# Patient Record
Sex: MALE | Race: WHITE | NOT HISPANIC OR LATINO | Employment: OTHER | URBAN - NONMETROPOLITAN AREA
[De-identification: names, ages, dates, MRNs, and addresses within clinical notes are randomized per-mention and may not be internally consistent; named-entity substitution may affect disease eponyms.]

---

## 2019-03-12 ENCOUNTER — OFFICE VISIT (OUTPATIENT)
Dept: UROLOGY | Facility: CLINIC | Age: 74
End: 2019-03-12

## 2019-03-12 VITALS — HEIGHT: 71 IN | WEIGHT: 178.4 LBS | BODY MASS INDEX: 24.98 KG/M2

## 2019-03-12 DIAGNOSIS — R33.8 BENIGN PROSTATIC HYPERPLASIA WITH URINARY RETENTION: Primary | ICD-10-CM

## 2019-03-12 DIAGNOSIS — N40.1 BENIGN PROSTATIC HYPERPLASIA WITH URINARY RETENTION: Primary | ICD-10-CM

## 2019-03-12 PROCEDURE — 99203 OFFICE O/P NEW LOW 30 MIN: CPT | Performed by: UROLOGY

## 2019-03-12 RX ORDER — CHLORTHALIDONE 50 MG/1
25 TABLET ORAL DAILY
COMMUNITY

## 2019-03-12 RX ORDER — POTASSIUM CHLORIDE 1.5 G/1.77G
20 POWDER, FOR SOLUTION ORAL DAILY
COMMUNITY

## 2019-03-12 RX ORDER — FINASTERIDE 5 MG/1
5 TABLET, FILM COATED ORAL DAILY
Qty: 30 TABLET | Refills: 11 | Status: SHIPPED | OUTPATIENT
Start: 2019-03-12

## 2019-03-12 RX ORDER — LISINOPRIL 20 MG/1
1 TABLET ORAL DAILY
COMMUNITY
Start: 2019-01-08

## 2019-03-12 RX ORDER — OXYBUTYNIN CHLORIDE 5 MG/1
1 TABLET ORAL DAILY
COMMUNITY
Start: 2019-01-08 | End: 2019-03-12 | Stop reason: ALTCHOICE

## 2019-03-12 RX ORDER — TAMSULOSIN HYDROCHLORIDE 0.4 MG/1
1 CAPSULE ORAL NIGHTLY
COMMUNITY
Start: 2018-12-15

## 2019-03-12 RX ORDER — DILTIAZEM HYDROCHLORIDE 360 MG/1
1 CAPSULE, EXTENDED RELEASE ORAL DAILY
COMMUNITY
Start: 2019-01-09

## 2019-03-12 RX ORDER — PRAVASTATIN SODIUM 40 MG
1 TABLET ORAL NIGHTLY
COMMUNITY
Start: 2019-01-14

## 2019-03-12 RX ORDER — HYDROCODONE BITARTRATE AND ACETAMINOPHEN 10; 325 MG/1; MG/1
1 TABLET ORAL 2 TIMES DAILY
COMMUNITY
Start: 2019-02-20

## 2019-03-12 RX ORDER — FUROSEMIDE 20 MG/1
1 TABLET ORAL DAILY
COMMUNITY
Start: 2019-01-08

## 2019-03-12 NOTE — PROGRESS NOTES
Chief Complaint:          retention    HPI:   74 y.o. male.  Pt went into retention and had a catheter placed for over a liter.  Pt has been flomax for 3 years. Pt had colon cancer diagnosed in  2016.  He sees Dr. Dempsey for his colon cancer.  HPI      Past Medical History:        Past Medical History:   Diagnosis Date   • Cancer (CMS/HCC)    • Colon cancer (CMS/HCC)    • Diabetes (CMS/HCC)    • Hypertension          Current Meds:     Current Outpatient Medications   Medication Sig Dispense Refill   • chlorthalidone (HYGROTEN) 50 MG tablet Take 25 mg by mouth Daily.     • diltiaZEM (TIAZAC) 360 MG 24 hr capsule Take 1 capsule by mouth Daily.     • furosemide (LASIX) 20 MG tablet Take 1 tablet by mouth Daily.     • HYDROcodone-acetaminophen (NORCO)  MG per tablet Take 1 tablet by mouth 2 (Two) Times a Day.     • insulin NPH (humuLIN N,novoLIN N) 100 UNIT/ML injection Inject 30 Units under the skin into the appropriate area as directed 2 (Two) Times a Day Before Meals.     • insulin regular (humuLIN R,novoLIN R) 100 UNIT/ML injection Inject 10 Units under the skin into the appropriate area as directed 2 (Two) Times a Day Before Meals.     • lisinopril (PRINIVIL,ZESTRIL) 20 MG tablet Take 1 tablet by mouth Daily.     • potassium chloride (KLOR-CON) 20 MEQ packet Take 20 mEq by mouth Daily.     • pravastatin (PRAVACHOL) 40 MG tablet Take 1 tablet by mouth Every Night.     • tamsulosin (FLOMAX) 0.4 MG capsule 24 hr capsule Take 1 capsule by mouth Every Night.     • finasteride (PROSCAR) 5 MG tablet Take 1 tablet by mouth Daily. 30 tablet 11     No current facility-administered medications for this visit.         Allergies:      Allergies   Allergen Reactions   • Dexamethasone Other (See Comments)     Unsure   • Gabapentin Other (See Comments)     Unsure   • Hydrocortisone Unknown (See Comments)     Unsure   • Prednisolone Other (See Comments)     Unsure   • Pregabalin Other (See Comments)     Unsure        Past  Surgical History:     Past Surgical History:   Procedure Laterality Date   • COLON SURGERY     • LUNG BIOPSY           Social History:     Social History     Socioeconomic History   • Marital status:      Spouse name: Not on file   • Number of children: Not on file   • Years of education: Not on file   • Highest education level: Not on file   Social Needs   • Financial resource strain: Not on file   • Food insecurity - worry: Not on file   • Food insecurity - inability: Not on file   • Transportation needs - medical: Not on file   • Transportation needs - non-medical: Not on file   Occupational History   • Not on file   Tobacco Use   • Smoking status: Current Every Day Smoker   • Smokeless tobacco: Never Used   Substance and Sexual Activity   • Alcohol use: Yes     Frequency: Never   • Drug use: No   • Sexual activity: Not on file   Other Topics Concern   • Not on file   Social History Narrative   • Not on file       Family History:     Family History   Problem Relation Age of Onset   • Hypertension Father    • Heart disease Father    • Cancer Mother    • Diabetes insipidus Paternal Grandmother        Review of Systems:     Review of Systems   Constitutional: Positive for fatigue. Negative for chills and fever.   HENT: Negative for sinus pressure and sinus pain.    Respiratory: Negative for cough.    Cardiovascular: Positive for leg swelling.   Gastrointestinal: Positive for diarrhea. Negative for abdominal pain and constipation.   Genitourinary: Positive for dysuria, hematuria and penile pain. Negative for frequency and urgency.   Neurological: Negative for headaches.   Psychiatric/Behavioral: The patient is not nervous/anxious.          Physical Exam:     Physical Exam   Constitutional: He is oriented to person, place, and time.   HENT:   Head: Normocephalic and atraumatic.   Right Ear: External ear normal.   Left Ear: External ear normal.   Nose: Nose normal.   Mouth/Throat: Oropharynx is clear and moist.  "  Eyes: Conjunctivae and EOM are normal. Pupils are equal, round, and reactive to light.   Neck: Normal range of motion. Neck supple. No thyromegaly present.   Cardiovascular: Normal rate, regular rhythm, normal heart sounds and intact distal pulses.   No murmur heard.  Pulmonary/Chest: Effort normal and breath sounds normal. No respiratory distress. He has no wheezes. He has no rales. He exhibits no tenderness.   Abdominal: Soft. Bowel sounds are normal. He exhibits no distension and no mass. There is no tenderness. No hernia.   Genitourinary: Rectum normal, prostate normal and penis normal.   Genitourinary Comments: Moderately enlarged no nodules   Musculoskeletal: Normal range of motion. He exhibits no edema or tenderness.   Lymphadenopathy:     He has no cervical adenopathy.   Neurological: He is alert and oriented to person, place, and time. No cranial nerve deficit. He exhibits normal muscle tone. Coordination normal.   Skin: Skin is warm. No rash noted.   Psychiatric: He has a normal mood and affect. His behavior is normal. Judgment and thought content normal.   Nursing note and vitals reviewed.      Ht 180.3 cm (71\")   Wt 80.9 kg (178 lb 6.4 oz)   BMI 24.88 kg/m²    Procedure:         Assessment:     Encounter Diagnosis   Name Primary?   • Benign prostatic hyperplasia with urinary retention Yes     Orders Placed This Encounter   Procedures   • PSA DIAGNOSTIC     Standing Status:   Future     Standing Expiration Date:   3/11/2020       Plan:   Will add proscar to his flomax and will check a psa today.  Will do voiding trial next week.    Patient's Body mass index is 24.88 kg/m². BMI is within normal parameters. No follow-up required..      Counseling was given to patient and family for the following topics instructions for management as follows: retention. The interim medical history and current results were reviewed.  A treatment plan with follow-up was made for Benign prostatic hyperplasia with urinary " retention [N40.1, R33.8].   This document has been electronically signed by John Mcgovern MD March 12, 2019 3:30 PM

## 2019-03-19 ENCOUNTER — OFFICE VISIT (OUTPATIENT)
Dept: UROLOGY | Facility: CLINIC | Age: 74
End: 2019-03-19

## 2019-03-19 VITALS — BODY MASS INDEX: 24.92 KG/M2 | HEIGHT: 71 IN | WEIGHT: 178 LBS

## 2019-03-19 DIAGNOSIS — N40.1 BENIGN PROSTATIC HYPERPLASIA WITH URINARY RETENTION: Primary | ICD-10-CM

## 2019-03-19 DIAGNOSIS — N39.0 URINARY TRACT INFECTION WITHOUT HEMATURIA, SITE UNSPECIFIED: ICD-10-CM

## 2019-03-19 DIAGNOSIS — R33.8 BENIGN PROSTATIC HYPERPLASIA WITH URINARY RETENTION: Primary | ICD-10-CM

## 2019-03-19 PROCEDURE — 99213 OFFICE O/P EST LOW 20 MIN: CPT | Performed by: UROLOGY

## 2019-03-19 RX ORDER — NITROFURANTOIN MACROCRYSTALS 100 MG/1
100 CAPSULE ORAL 4 TIMES DAILY
Qty: 28 CAPSULE | Refills: 0 | Status: SHIPPED | OUTPATIENT
Start: 2019-03-19 | End: 2019-03-26

## 2019-03-19 NOTE — PROGRESS NOTES
Chief Complaint:          retention    HPI:   74 y.o. male.  Pt has voided 3 times since his catheter came out and his post void bladder scan is 48 cc today.  Pt has been much improved.  HPI      Past Medical History:        Past Medical History:   Diagnosis Date   • Cancer (CMS/HCC)    • Colon cancer (CMS/HCC)    • Diabetes (CMS/HCC)    • Hypertension          Current Meds:     Current Outpatient Medications   Medication Sig Dispense Refill   • chlorthalidone (HYGROTEN) 50 MG tablet Take 25 mg by mouth Daily.     • diltiaZEM (TIAZAC) 360 MG 24 hr capsule Take 1 capsule by mouth Daily.     • finasteride (PROSCAR) 5 MG tablet Take 1 tablet by mouth Daily. 30 tablet 11   • furosemide (LASIX) 20 MG tablet Take 1 tablet by mouth Daily.     • HYDROcodone-acetaminophen (NORCO)  MG per tablet Take 1 tablet by mouth 2 (Two) Times a Day.     • insulin NPH (humuLIN N,novoLIN N) 100 UNIT/ML injection Inject 30 Units under the skin into the appropriate area as directed 2 (Two) Times a Day Before Meals.     • insulin regular (humuLIN R,novoLIN R) 100 UNIT/ML injection Inject 10 Units under the skin into the appropriate area as directed 2 (Two) Times a Day Before Meals.     • lisinopril (PRINIVIL,ZESTRIL) 20 MG tablet Take 1 tablet by mouth Daily.     • potassium chloride (KLOR-CON) 20 MEQ packet Take 20 mEq by mouth Daily.     • pravastatin (PRAVACHOL) 40 MG tablet Take 1 tablet by mouth Every Night.     • tamsulosin (FLOMAX) 0.4 MG capsule 24 hr capsule Take 1 capsule by mouth Every Night.     • nitrofurantoin (MACRODANTIN) 100 MG capsule Take 1 capsule by mouth 4 (Four) Times a Day for 7 days. 28 capsule 0     No current facility-administered medications for this visit.         Allergies:      Allergies   Allergen Reactions   • Dexamethasone Other (See Comments)     Unsure   • Gabapentin Other (See Comments)     Unsure   • Hydrocortisone Unknown (See Comments)     Unsure   • Prednisolone Other (See Comments)     Unsure    • Pregabalin Other (See Comments)     Unsure        Past Surgical History:     Past Surgical History:   Procedure Laterality Date   • COLON SURGERY     • LUNG BIOPSY           Social History:     Social History     Socioeconomic History   • Marital status:      Spouse name: Not on file   • Number of children: Not on file   • Years of education: Not on file   • Highest education level: Not on file   Social Needs   • Financial resource strain: Not on file   • Food insecurity - worry: Not on file   • Food insecurity - inability: Not on file   • Transportation needs - medical: Not on file   • Transportation needs - non-medical: Not on file   Occupational History   • Not on file   Tobacco Use   • Smoking status: Current Every Day Smoker   • Smokeless tobacco: Never Used   Substance and Sexual Activity   • Alcohol use: Yes     Frequency: Never   • Drug use: No   • Sexual activity: Not on file   Other Topics Concern   • Not on file   Social History Narrative   • Not on file       Family History:     Family History   Problem Relation Age of Onset   • Hypertension Father    • Heart disease Father    • Cancer Mother    • Diabetes insipidus Paternal Grandmother        Review of Systems:     Review of Systems   Constitutional: Negative for fatigue.   HENT: Negative for sinus pressure and sinus pain.    Eyes: Negative for pain and redness.   Respiratory: Negative for chest tightness.    Cardiovascular: Negative for chest pain.   Gastrointestinal: Negative for abdominal pain, constipation and diarrhea.   Endocrine: Negative for heat intolerance.   Genitourinary: Negative for difficulty urinating and hematuria.   Musculoskeletal: Negative for back pain.   Skin: Negative for rash.   Allergic/Immunologic: Negative for food allergies.   Neurological: Negative for headaches.   Hematological: Does not bruise/bleed easily.   Psychiatric/Behavioral: The patient is not nervous/anxious.              I have reviewed the follow  "portions of the patient's history and confirmed they are accurate today:  allergies, current medications, past family history, past medical history, past social history, past surgical history, problem list and ROS  Physical Exam:     Physical Exam   Constitutional: He is oriented to person, place, and time.   HENT:   Head: Normocephalic and atraumatic.   Right Ear: External ear normal.   Left Ear: External ear normal.   Nose: Nose normal.   Mouth/Throat: Oropharynx is clear and moist.   Eyes: Conjunctivae and EOM are normal. Pupils are equal, round, and reactive to light.   Neck: Normal range of motion. Neck supple. No thyromegaly present.   Cardiovascular: Normal rate, regular rhythm, normal heart sounds and intact distal pulses.   No murmur heard.  Pulmonary/Chest: Effort normal and breath sounds normal. No respiratory distress. He has no wheezes. He has no rales. He exhibits no tenderness.   Abdominal: Soft. Bowel sounds are normal. He exhibits no distension and no mass. There is no tenderness. No hernia.   Genitourinary: Penis normal.   Musculoskeletal: Normal range of motion. He exhibits no edema or tenderness.   Lymphadenopathy:     He has no cervical adenopathy.   Neurological: He is alert and oriented to person, place, and time. No cranial nerve deficit. He exhibits normal muscle tone. Coordination normal.   Skin: Skin is warm. No rash noted.   Psychiatric: He has a normal mood and affect. His behavior is normal. Judgment and thought content normal.   Nursing note and vitals reviewed.      Ht 180.3 cm (71\")   Wt 80.7 kg (178 lb)   BMI 24.83 kg/m²    Procedure:         Assessment:     Encounter Diagnoses   Name Primary?   • Benign prostatic hyperplasia with urinary retention Yes   • Urinary tract infection without hematuria, site unspecified      No orders of the defined types were placed in this encounter.      Plan:   Will see him back in 6 months and he needs a psa today.  Will treat catheter related " bacteria with macrodantin     Patient's Body mass index is 24.83 kg/m². BMI is within normal parameters. No follow-up required..      Counseling was given to patient for the following topics instructions for management as follows: bph. The interim medical history and current results were reviewed.  A treatment plan with follow-up was made for Benign prostatic hyperplasia with urinary retention [N40.1, R33.8].  This document has been electronically signed by John Mcgovern MD March 19, 2019 2:37 PM

## 2019-04-23 ENCOUNTER — OFFICE VISIT (OUTPATIENT)
Dept: UROLOGY | Facility: CLINIC | Age: 74
End: 2019-04-23

## 2019-04-23 DIAGNOSIS — R33.8 BENIGN PROSTATIC HYPERPLASIA WITH URINARY RETENTION: Primary | ICD-10-CM

## 2019-04-23 DIAGNOSIS — N40.1 BENIGN PROSTATIC HYPERPLASIA WITH URINARY RETENTION: Primary | ICD-10-CM

## 2019-04-23 LAB
BILIRUB BLD-MCNC: NEGATIVE MG/DL
CLARITY, POC: CLEAR
COLOR UR: YELLOW
GLUCOSE UR STRIP-MCNC: ABNORMAL MG/DL
KETONES UR QL: NEGATIVE
LEUKOCYTE EST, POC: NEGATIVE
NITRITE UR-MCNC: NEGATIVE MG/ML
PH UR: 5 [PH] (ref 5–8)
PROT UR STRIP-MCNC: NEGATIVE MG/DL
RBC # UR STRIP: NEGATIVE /UL
SP GR UR: 1.01 (ref 1–1.03)
UROBILINOGEN UR QL: NORMAL

## 2019-04-23 PROCEDURE — 81002 URINALYSIS NONAUTO W/O SCOPE: CPT | Performed by: UROLOGY

## 2019-04-23 PROCEDURE — 51798 US URINE CAPACITY MEASURE: CPT | Performed by: UROLOGY

## 2019-04-23 PROCEDURE — 84153 ASSAY OF PSA TOTAL: CPT | Performed by: UROLOGY

## 2019-04-23 PROCEDURE — 99214 OFFICE O/P EST MOD 30 MIN: CPT | Performed by: UROLOGY

## 2019-04-23 NOTE — PROGRESS NOTES
Chief Complaint:          retention    HPI:   74 y.o. male.  Pt is voiding much better.  His post void bladder scan is 85 cc today.  He is on maximal medical therapy  HPI      Past Medical History:        Past Medical History:   Diagnosis Date   • Cancer (CMS/HCC)    • Colon cancer (CMS/HCC)    • Diabetes (CMS/HCC)    • Hypertension          Current Meds:     Current Outpatient Medications   Medication Sig Dispense Refill   • chlorthalidone (HYGROTEN) 50 MG tablet Take 25 mg by mouth Daily.     • diltiaZEM (TIAZAC) 360 MG 24 hr capsule Take 1 capsule by mouth Daily.     • finasteride (PROSCAR) 5 MG tablet Take 1 tablet by mouth Daily. 30 tablet 11   • furosemide (LASIX) 20 MG tablet Take 1 tablet by mouth Daily.     • HYDROcodone-acetaminophen (NORCO)  MG per tablet Take 1 tablet by mouth 2 (Two) Times a Day.     • insulin NPH (humuLIN N,novoLIN N) 100 UNIT/ML injection Inject 30 Units under the skin into the appropriate area as directed 2 (Two) Times a Day Before Meals.     • insulin regular (humuLIN R,novoLIN R) 100 UNIT/ML injection Inject 10 Units under the skin into the appropriate area as directed 2 (Two) Times a Day Before Meals.     • lisinopril (PRINIVIL,ZESTRIL) 20 MG tablet Take 1 tablet by mouth Daily.     • potassium chloride (KLOR-CON) 20 MEQ packet Take 20 mEq by mouth Daily.     • pravastatin (PRAVACHOL) 40 MG tablet Take 1 tablet by mouth Every Night.     • tamsulosin (FLOMAX) 0.4 MG capsule 24 hr capsule Take 1 capsule by mouth Every Night.       No current facility-administered medications for this visit.         Allergies:      Allergies   Allergen Reactions   • Dexamethasone Other (See Comments)     Unsure   • Gabapentin Other (See Comments)     Unsure   • Hydrocortisone Unknown (See Comments)     Unsure   • Prednisolone Other (See Comments)     Unsure   • Pregabalin Other (See Comments)     Unsure        Past Surgical History:     Past Surgical History:   Procedure Laterality Date   •  COLON SURGERY     • LUNG BIOPSY           Social History:     Social History     Socioeconomic History   • Marital status:      Spouse name: Not on file   • Number of children: Not on file   • Years of education: Not on file   • Highest education level: Not on file   Tobacco Use   • Smoking status: Current Every Day Smoker   • Smokeless tobacco: Never Used   Substance and Sexual Activity   • Alcohol use: Yes     Frequency: Never   • Drug use: No       Family History:     Family History   Problem Relation Age of Onset   • Hypertension Father    • Heart disease Father    • Cancer Mother    • Diabetes insipidus Paternal Grandmother        Review of Systems:     Review of Systems   Constitutional: Negative for appetite change and fatigue.   HENT: Negative for ear pain and rhinorrhea.    Eyes: Negative for pain.   Respiratory: Negative for choking and stridor.    Genitourinary: Positive for frequency. Negative for enuresis, penile swelling and urgency.   Musculoskeletal: Negative for back pain and neck pain.   Neurological: Negative for light-headedness.   Psychiatric/Behavioral: Positive for agitation.       IPSS Questionnaire (AUA-7):  Over the past month…    1)  Incomplete Emptying  How often have you had a sensation of not emptying your bladder?  1 - Less than 1 time in 5   2)  Frequency  How often have you had to urinate less than every two hours? 2 - Less than half the time   3)  Intermittency  How often have you found you stopped and started again several times when you urinated?  1 - Less than 1 time in 5   4) Urgency  How often have you found it difficult to postpone urination?  1 - Less than 1 time in 5   5) Weak Stream  How often have you had a weak urinary stream?  1 - Less than 1 time in 5   6) Straining  How often have you had to push or strain to begin urination?  1 - Less than 1 time in 5   7) Nocturia  How many times did you typically get up at night to urinate?  2 - 2 times   Total Score:  9        Quality of life due to urinary symptoms:  If you were to spend the rest of your life with your urinary condition the way it is now, how would you feel about that? 2-Mostly Satisfied   Urine Leakage (Incontinence) 0-No Leakage       I have reviewed the follow portions of the patient's history and confirmed they are accurate today:  allergies, current medications, past family history, past medical history, past social history, past surgical history, problem list and ROS  Physical Exam:     Physical Exam   Constitutional: He is oriented to person, place, and time.   HENT:   Head: Normocephalic and atraumatic.   Right Ear: External ear normal.   Left Ear: External ear normal.   Nose: Nose normal.   Mouth/Throat: Oropharynx is clear and moist.   Eyes: Conjunctivae and EOM are normal. Pupils are equal, round, and reactive to light.   Neck: Normal range of motion. Neck supple. No thyromegaly present.   Cardiovascular: Normal rate, regular rhythm, normal heart sounds and intact distal pulses.   No murmur heard.  Pulmonary/Chest: Effort normal and breath sounds normal. No respiratory distress. He has no wheezes. He has no rales. He exhibits no tenderness.   Abdominal: Soft. Bowel sounds are normal. He exhibits no distension and no mass. There is no tenderness. No hernia.   Genitourinary: Rectum normal, prostate normal and penis normal.   Genitourinary Comments: enlarged   Musculoskeletal: Normal range of motion. He exhibits no edema or tenderness.   Lymphadenopathy:     He has no cervical adenopathy.   Neurological: He is alert and oriented to person, place, and time. No cranial nerve deficit. He exhibits normal muscle tone. Coordination normal.   Skin: Skin is warm. No rash noted.   Psychiatric: He has a normal mood and affect. His behavior is normal. Judgment and thought content normal.   Nursing note and vitals reviewed.      There were no vitals taken for this visit.   Procedure:         Assessment:     Encounter  Diagnosis   Name Primary?   • Benign prostatic hyperplasia with urinary retention Yes     Orders Placed This Encounter   Procedures   • Bladder Scan   • POC Urinalysis Dipstick       Plan:   Will see pt again in a year.  Will check his psa    Counseling was given to patient and family for the following topics instructions for management as follows: retention. The interim medical history and current results were reviewed.  A treatment plan with follow-up was made for Benign prostatic hyperplasia with urinary retention [N40.1, R33.8]. I spent 26 minutes face to face with Sadi Bran and 80 percentage was spent in counseling.    This document has been electronically signed by John Mcgovern MD April 23, 2019 3:30 PM

## 2019-04-24 LAB — PSA SERPL-MCNC: 4.4 NG/ML (ref 0–4)
